# Patient Record
Sex: MALE | Race: WHITE | HISPANIC OR LATINO | Employment: STUDENT | URBAN - METROPOLITAN AREA
[De-identification: names, ages, dates, MRNs, and addresses within clinical notes are randomized per-mention and may not be internally consistent; named-entity substitution may affect disease eponyms.]

---
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
## 2025-03-04 ENCOUNTER — APPOINTMENT (OUTPATIENT)
Dept: LAB | Facility: CLINIC | Age: 20
End: 2025-03-04

## 2025-03-04 ENCOUNTER — APPOINTMENT (OUTPATIENT)
Dept: URGENT CARE | Facility: CLINIC | Age: 20
End: 2025-03-04

## 2025-03-04 DIAGNOSIS — Z02.1 PRE-EMPLOYMENT EXAMINATION: ICD-10-CM

## 2025-03-04 DIAGNOSIS — Z02.1 PRE-EMPLOYMENT EXAMINATION: Primary | ICD-10-CM

## 2025-03-04 LAB — RUBV IGG SERPL IA-ACNC: 23.6 IU/ML

## 2025-03-04 PROCEDURE — 86762 RUBELLA ANTIBODY: CPT

## 2025-03-04 PROCEDURE — 86787 VARICELLA-ZOSTER ANTIBODY: CPT

## 2025-03-04 PROCEDURE — 86735 MUMPS ANTIBODY: CPT

## 2025-03-04 PROCEDURE — 86480 TB TEST CELL IMMUN MEASURE: CPT

## 2025-03-04 PROCEDURE — 86765 RUBEOLA ANTIBODY: CPT

## 2025-03-04 PROCEDURE — 36415 COLL VENOUS BLD VENIPUNCTURE: CPT

## 2025-03-05 LAB
GAMMA INTERFERON BACKGROUND BLD IA-ACNC: 0.06 IU/ML
M TB IFN-G BLD-IMP: NEGATIVE
M TB IFN-G CD4+ BCKGRND COR BLD-ACNC: -0.04 IU/ML
M TB IFN-G CD4+ BCKGRND COR BLD-ACNC: -0.05 IU/ML
MEV IGG SER QL IA: 20.2 AU/ML
MEV IGG SER QL IA: POSITIVE
MITOGEN IGNF BCKGRD COR BLD-ACNC: 9.94 IU/ML
MUV IGG SER QL IA: 19.7 AU/ML
MUV IGG SER QL IA: POSITIVE
VZV IGG SER QL IA: 1.21 S/CO
VZV IGG SER QL IA: POSITIVE